# Patient Record
Sex: FEMALE | Race: WHITE | URBAN - METROPOLITAN AREA
[De-identification: names, ages, dates, MRNs, and addresses within clinical notes are randomized per-mention and may not be internally consistent; named-entity substitution may affect disease eponyms.]

---

## 2022-12-30 ENCOUNTER — APPOINTMENT (RX ONLY)
Dept: URBAN - METROPOLITAN AREA CLINIC 333 | Facility: CLINIC | Age: 67
Setting detail: DERMATOLOGY
End: 2022-12-30

## 2022-12-30 DIAGNOSIS — Z00.00 ENCOUNTER FOR GENERAL ADULT MEDICAL EXAMINATION WITHOUT ABNORMAL FINDINGS: ICD-10-CM | Status: STABLE

## 2022-12-30 PROCEDURE — ? COUNSELING

## 2022-12-30 PROCEDURE — 99202 OFFICE O/P NEW SF 15 MIN: CPT

## 2022-12-30 ASSESSMENT — LOCATION DETAILED DESCRIPTION DERM: LOCATION DETAILED: LEFT SUPERIOR UPPER BACK

## 2022-12-30 ASSESSMENT — LOCATION SIMPLE DESCRIPTION DERM: LOCATION SIMPLE: LEFT UPPER BACK

## 2022-12-30 ASSESSMENT — LOCATION ZONE DERM: LOCATION ZONE: TRUNK

## 2022-12-30 NOTE — PROCEDURE: COUNSELING
Detail Level: Simple
Patient Specific Counseling (Will Not Stick From Patient To Patient): No visual or palpable findings of rash or lesion.\\nPatient complains internal pain even to the touch.\\nPatient is on a low dose of daily prednisone (10mg qd), patient states pain has been consistent for years but recently had progressed. Patient states she has not noticed an increase in pain since decreasing her prednisone from 20mg to 10mg.\\n\\nPatient states she has had X-rays done of the area throughout the years with negative findings.\\n\\nPatient was instructed to follow up with her rheumatologist for further work up like an MRI.

## 2022-12-30 NOTE — HPI: SKIN LESION
Is This A New Presentation, Or A Follow-Up?: Skin Lesion
Additional History: \\n\\nPatient has RA and was t9ld by her rheumatologist to see us for the pain on her left posterior shoulder